# Patient Record
Sex: FEMALE
[De-identification: names, ages, dates, MRNs, and addresses within clinical notes are randomized per-mention and may not be internally consistent; named-entity substitution may affect disease eponyms.]

---

## 2019-03-12 PROBLEM — Z00.00 ENCOUNTER FOR PREVENTIVE HEALTH EXAMINATION: Status: ACTIVE | Noted: 2019-03-12

## 2019-04-04 ENCOUNTER — APPOINTMENT (OUTPATIENT)
Dept: RHEUMATOLOGY | Facility: CLINIC | Age: 69
End: 2019-04-04

## 2019-04-09 ENCOUNTER — LABORATORY RESULT (OUTPATIENT)
Age: 69
End: 2019-04-09

## 2019-04-09 ENCOUNTER — APPOINTMENT (OUTPATIENT)
Dept: RHEUMATOLOGY | Facility: CLINIC | Age: 69
End: 2019-04-09
Payer: MEDICARE

## 2019-04-09 VITALS
OXYGEN SATURATION: 95 % | DIASTOLIC BLOOD PRESSURE: 70 MMHG | WEIGHT: 99.5 LBS | BODY MASS INDEX: 18.31 KG/M2 | HEIGHT: 62 IN | SYSTOLIC BLOOD PRESSURE: 98 MMHG | HEART RATE: 96 BPM

## 2019-04-09 PROCEDURE — 36415 COLL VENOUS BLD VENIPUNCTURE: CPT

## 2019-04-09 PROCEDURE — 99205 OFFICE O/P NEW HI 60 MIN: CPT | Mod: 25

## 2019-04-10 LAB
ALBUMIN SERPL ELPH-MCNC: 3.7 G/DL
ALP BLD-CCNC: 96 U/L
ALT SERPL-CCNC: <5 U/L
ANION GAP SERPL CALC-SCNC: 16 MMOL/L
APPEARANCE: CLEAR
APTT BLD: 34.2 SEC
AST SERPL-CCNC: 16 U/L
B2 MICROGLOB SERPL-MCNC: 3.1 MG/L
BASOPHILS # BLD AUTO: 0.09 K/UL
BASOPHILS NFR BLD AUTO: 0.9 %
BILIRUB SERPL-MCNC: 0.3 MG/DL
BILIRUBIN URINE: NEGATIVE
BLOOD URINE: NEGATIVE
BUN SERPL-MCNC: 18 MG/DL
C3 SERPL-MCNC: 120 MG/DL
C4 SERPL-MCNC: 22 MG/DL
CALCIUM SERPL-MCNC: 9.6 MG/DL
CHLORIDE SERPL-SCNC: 100 MMOL/L
CK SERPL-CCNC: 52 U/L
CO2 SERPL-SCNC: 21 MMOL/L
COLOR: YELLOW
CREAT SERPL-MCNC: 0.54 MG/DL
EOSINOPHIL # BLD AUTO: 0.25 K/UL
EOSINOPHIL NFR BLD AUTO: 2.6 %
ERYTHROCYTE [SEDIMENTATION RATE] IN BLOOD BY WESTERGREN METHOD: 115 MM/HR
GLUCOSE QUALITATIVE U: NEGATIVE
GLUCOSE SERPL-MCNC: 51 MG/DL
HCT VFR BLD CALC: 39.8 %
HGB BLD-MCNC: 11.2 G/DL
KETONES URINE: NEGATIVE
LEUKOCYTE ESTERASE URINE: NEGATIVE
LYMPHOCYTES # BLD AUTO: 3.47 K/UL
LYMPHOCYTES NFR BLD AUTO: 36.3 %
MAN DIFF?: NORMAL
MCHC RBC-ENTMCNC: 23.9 PG
MCHC RBC-ENTMCNC: 28.1 GM/DL
MCV RBC AUTO: 84.9 FL
MONOCYTES # BLD AUTO: 0.42 K/UL
MONOCYTES NFR BLD AUTO: 4.4 %
MPO AB + PR3 PNL SER: NORMAL
NEUTROPHILS # BLD AUTO: 5.24 K/UL
NEUTROPHILS NFR BLD AUTO: 54.9 %
NITRITE URINE: NEGATIVE
PH URINE: 6
PLATELET # BLD AUTO: 644 K/UL
POTASSIUM SERPL-SCNC: 4.4 MMOL/L
PROT SERPL-MCNC: 8.3 G/DL
PROTEIN URINE: ABNORMAL
RBC # BLD: 4.69 M/UL
RBC # FLD: 20.3 %
RHEUMATOID FACT SER QL: >650 IU/ML
SODIUM SERPL-SCNC: 137 MMOL/L
SPECIFIC GRAVITY URINE: 1.03
URATE SERPL-MCNC: 3.2 MG/DL
UROBILINOGEN URINE: NORMAL
WBC # FLD AUTO: 9.55 K/UL

## 2019-04-10 NOTE — CONSULT LETTER
[Dear  ___] : Dear  [unfilled], [Consult Letter:] : I had the pleasure of evaluating your patient, [unfilled]. [Consult Closing:] : Thank you very much for allowing me to participate in the care of this patient.  If you have any questions, please do not hesitate to contact me. [Please see my note below.] : Please see my note below. [Sincerely,] : Sincerely, [FreeTextEntry2] : Dr. Petr Manzo\par [address],\[place] [address] [FreeTextEntry3] : \par \par \par \par Valdemar Roberts M.D.\par Director, Infusion Medicine and [facility]\par [facility]\par

## 2019-04-10 NOTE — PHYSICAL EXAM
[General Appearance - Alert] : alert [General Appearance - In No Acute Distress] : in no acute distress [Sclera] : the sclera and conjunctiva were normal [PERRL With Normal Accommodation] : pupils were equal in size, round, and reactive to light [Extraocular Movements] : extraocular movements were intact [Oropharynx] : the oropharynx was normal [Outer Ear] : the ears and nose were normal in appearance [Neck Appearance] : the appearance of the neck was normal [Neck Cervical Mass (___cm)] : no neck mass was observed [Jugular Venous Distention Increased] : there was no jugular-venous distention [Thyroid Diffuse Enlargement] : the thyroid was not enlarged [Thyroid Nodule] : there were no palpable thyroid nodules [Auscultation Breath Sounds / Voice Sounds] : lungs were clear to auscultation bilaterally [Heart Rate And Rhythm] : heart rate was normal and rhythm regular [Heart Sounds] : normal S1 and S2 [Murmurs] : no murmurs [Heart Sounds Gallop] : no gallops [Heart Sounds Pericardial Friction Rub] : no pericardial rub [Abdomen Soft] : soft [Bowel Sounds] : normal bowel sounds [Abdomen Tenderness] : non-tender [] : no hepato-splenomegaly [Abdomen Mass (___ Cm)] : no abdominal mass palpated [Normal Sphincter Tone] : normal sphincter tone [No Rectal Mass] : no rectal mass [Cervical Lymph Nodes Enlarged Posterior Bilaterally] : posterior cervical [Cervical Lymph Nodes Enlarged Anterior Bilaterally] : anterior cervical [Supraclavicular Lymph Nodes Enlarged Bilaterally] : supraclavicular [Axillary Lymph Nodes Enlarged Bilaterally] : axillary [Femoral Lymph Nodes Enlarged Bilaterally] : femoral [Inguinal Lymph Nodes Enlarged Bilaterally] : inguinal [No Spinal Tenderness] : no spinal tenderness [No CVA Tenderness] : no ~M costovertebral angle tenderness [Abnormal Walk] : normal gait [Nail Clubbing] : no clubbing  or cyanosis of the fingernails [Musculoskeletal - Swelling] : no joint swelling seen [Motor Tone] : muscle strength and tone were normal [Oriented To Time, Place, And Person] : oriented to person, place, and time [Impaired Insight] : insight and judgment were intact [Affect] : the affect was normal [Occult Blood Positive] : stool was negative for occult blood [FreeTextEntry1] : NO SKIN THICKENING OR TELANGIECTASIA ON FACE TRUNK EXTREM. LIVIDO RETICULARIS LE'S.

## 2019-04-10 NOTE — HISTORY OF PRESENT ILLNESS
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 Had to wait 6 mos to get approval for any other meds. Took NSAIDs until November, when developed pain at tailbone, went up back and into leg. Ulnar deviation 2012-13. Also had chest cold getting worse. Noticed fx in sacrum, with tachycardia with admission. Chest studies showed ILD and hilar adenopathy (see below re LN bx.). Sent home with "pneumonia, osteoporosis, scaitica". Pains now in knees and shoulders, wrists, intermittant. Now on Angelica 2mgs a day for one year. \par \par Uses a walker. Since Nov uses this had to stop using cane then.\par \par Late Dec through Jan, develop pericarditis, pleuritis. DC'ed on motrin and cholchicine. Had multiple autoimmune serologies RF, CCP, CHUY, anti-DS. + Hep B surface ab, core +. No known hx of hepatitis. Lymphoma suspected, but bx Feb neg for lymphoma. \par \par No lymphoma in family, no RA SLE, Hashimotos, Crohns, colitis.\par \par No history of male pattern baldness, bald spots or hair loss, malar rashes, photosensitivity, oral or nasal ulcerations, severe abdominal pain and fever, renal disease, epilepsy , psychiatric hospitalization, low wbc counts or swollen and painful joints. \par

## 2019-04-10 NOTE — ASSESSMENT
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 Rob Paez hepatologist re + hep B core ab.\par Refer Dr. Kvng Colunga, head div. Pulm Med [facility] re lung involvement.

## 2019-04-11 LAB
25(OH)D3 SERPL-MCNC: 55 NG/ML
B2 GLYCOPROT1 IGA SERPL IA-ACNC: <5 SAU
B2 GLYCOPROT1 IGG SER-ACNC: <5 SGU
B2 GLYCOPROT1 IGM SER-ACNC: 6.3 SMU
CENTROMERE IGG SER-ACNC: <0.2 CD:130001892
CH50 SERPL-MCNC: 21 U/ML
CONFIRM: 28.9 SEC
DRVVT IMM 1:2 NP PPP: NORMAL
DRVVT SCREEN TO CONFIRM RATIO: 1.12 RATIO
DSDNA AB SER-ACNC: <12 IU/ML
ENA RNP AB SER IA-ACNC: 0.2 AL
ENA SCL70 IGG SER IA-ACNC: <0.2 AL
ENA SM AB SER IA-ACNC: <0.2 AL
ENA SS-A AB SER IA-ACNC: <0.2 AL
ENA SS-B AB SER IA-ACNC: <0.2 AL
HISTONE AB SER QL: 0.5 UNITS
SCREEN DRVVT: 38.8 SEC
SILICA CLOTTING TIME INTERPRETATION: NORMAL
SILICA CLOTTING TIME S/C: 1.03 RATIO
T PALLIDUM AB SER QL IA: NEGATIVE

## 2019-04-16 ENCOUNTER — APPOINTMENT (OUTPATIENT)
Dept: RHEUMATOLOGY | Facility: CLINIC | Age: 69
End: 2019-04-16
Payer: MEDICARE

## 2019-04-16 LAB
ANA PAT FLD IF-IMP: ABNORMAL
ANA SER IF-ACNC: ABNORMAL
CARDIOLIPIN AB SER IA-ACNC: POSITIVE
CCP AB SER IA-ACNC: >250 UNITS
CRYOFIB BLD-MCNC: NEGATIVE
MISCELLANEOUS TEST: NORMAL
PROC NAME: NORMAL
RF+CCP IGG SER-IMP: ABNORMAL

## 2019-04-16 PROCEDURE — 36415 COLL VENOUS BLD VENIPUNCTURE: CPT

## 2019-04-19 LAB
HBV CORE IGG+IGM SER QL: REACTIVE
HBV E AB SER QL: NEGATIVE
HBV E AG SER QL: NEGATIVE
HBV SURFACE AB SER QL: REACTIVE
HBV SURFACE AG SER QL: NONREACTIVE
HCV AB SER QL: NONREACTIVE
HCV S/CO RATIO: 0.55 S/CO

## 2019-04-23 LAB
M TB IFN-G BLD-IMP: NEGATIVE
QUANTIFERON TB PLUS MITOGEN MINUS NIL: 4.27 IU/ML
QUANTIFERON TB PLUS NIL: 0.09 IU/ML
QUANTIFERON TB PLUS TB1 MINUS NIL: -0.03 IU/ML
QUANTIFERON TB PLUS TB2 MINUS NIL: -0.03 IU/ML

## 2019-05-30 ENCOUNTER — APPOINTMENT (OUTPATIENT)
Dept: PULMONOLOGY | Facility: CLINIC | Age: 69
End: 2019-05-30
Payer: MEDICARE

## 2019-05-30 VITALS
WEIGHT: 96.5 LBS | OXYGEN SATURATION: 98 % | DIASTOLIC BLOOD PRESSURE: 78 MMHG | TEMPERATURE: 97.6 F | SYSTOLIC BLOOD PRESSURE: 90 MMHG | HEIGHT: 62 IN | HEART RATE: 109 BPM | BODY MASS INDEX: 17.76 KG/M2

## 2019-05-30 DIAGNOSIS — Z87.898 PERSONAL HISTORY OF OTHER SPECIFIED CONDITIONS: ICD-10-CM

## 2019-05-30 DIAGNOSIS — Z87.891 PERSONAL HISTORY OF NICOTINE DEPENDENCE: ICD-10-CM

## 2019-05-30 DIAGNOSIS — Z82.49 FAMILY HISTORY OF ISCHEMIC HEART DISEASE AND OTHER DISEASES OF THE CIRCULATORY SYSTEM: ICD-10-CM

## 2019-05-30 DIAGNOSIS — Z77.29 CONTACT WITH AND (SUSPECTED) EXPOSURE TO OTHER HAZARDOUS SUBSTANCES: ICD-10-CM

## 2019-05-30 DIAGNOSIS — Z91.89 OTHER SPECIFIED PERSONAL RISK FACTORS, NOT ELSEWHERE CLASSIFIED: ICD-10-CM

## 2019-05-30 DIAGNOSIS — Z82.0 FAMILY HISTORY OF EPILEPSY AND OTHER DISEASES OF THE NERVOUS SYSTEM: ICD-10-CM

## 2019-05-30 PROCEDURE — 99204 OFFICE O/P NEW MOD 45 MIN: CPT

## 2019-05-30 PROCEDURE — 94060 EVALUATION OF WHEEZING: CPT

## 2019-05-30 PROCEDURE — 94729 DIFFUSING CAPACITY: CPT

## 2019-05-30 PROCEDURE — 94618 PULMONARY STRESS TESTING: CPT

## 2019-05-30 PROCEDURE — 94727 GAS DIL/WSHOT DETER LNG VOL: CPT

## 2019-05-31 ENCOUNTER — OTHER (OUTPATIENT)
Age: 69
End: 2019-05-31

## 2019-06-01 NOTE — HISTORY OF PRESENT ILLNESS
[FreeTextEntry1] : Pt is a 70 yo F with PMH mixed connective tissue disease, persistent tachycardia.\par Referred by Dr. Roberts for evaluation of ILD in the setting of mixed connective tissue disease.\par \par She was put on methotrexate in 2010 but she had a reaction so it was stopped. Currently on sulfasalazine and naproxen. \par \par She can walk 8+ blocks without any significant shortness of breath.  Walks with rollator.  Knees limit her, prevent her from going up the stairs. \par \par Had elevated HR and was seen in ED, cause was not diagnosed. Has been worked up with cardiology.  HR today is 109 bpm.\par PNA 11/2018, 2009.\par Had PET scan, negative for lymphoma.\par Typically has postnasal drip in the spring.\par \par Social history: Former cigarettes smoker (20 yo - 58 yo, 1/2 PPD, 20 pack-years), currently smokes marijuana few times per week, will also ingest cannabis on occasion, worked at an animal hospital\par FHx: father Alzheimers, mother heart disease

## 2019-06-01 NOTE — DISCUSSION/SUMMARY
[FreeTextEntry1] : ATTENDING SUMMARY \par 5-30-19 \par -nasal mucosa atrophic, no nasal discharge, no hepatojugular reflux \par -no JVD at 45 degrees \par -tachycardiac, HR regular\par -no dullness to percussion, adequate air entry, fine velcro inspiratory crackles\par -deforming arthritis in hands and knees\par -trace pitting edema L > R\par \par Pulmonary function tests demonstrate large lung volumes, mild expiratory airflow obstruction with no significant response to inhaled bronchodilator.   DLCO is mildly decreased. \par Patient walked 300 meters on 6MWT using rollator.    This represents a mildly reduced walk distance with no desaturation (97% to 98%)\par

## 2019-06-01 NOTE — PROCEDURE
[FreeTextEntry1] : PFT 19:\par FVC: 2.85 L (108%) --> 2.93 L (111%)\par FEV1: 1.77 L (84%) --> 1.79 L (84%)\par FEV1/FVC: 62%) --> 61%\par BHP02-13%: 0.90 L/s (40%) --> 0.91 L/s (41%)\par T.53 L (125%)\par RV/T%\par DLCO: 10.4 (73%)\par 6MWT: 300 meters, SpO2 start: 97% --> SpO2 end: 98%\par  Mild obstructive defect with no significant bronchodilator response. No restrictive defect. Mildly decreased diffusion capacity. Reduced walk distance with no desaturation.\par

## 2019-06-01 NOTE — PHYSICAL EXAM
[Normal Appearance] : normal appearance [IV] : IV [Murmurs] : no murmurs present [Gait - Sufficient For Exercise Testing] : the gait was sufficient for exercise testing [] : no rash [No Focal Deficits] : no focal deficits [Oriented To Time, Place, And Person] : oriented to person, place, and time [FreeTextEntry1] : deforming arthritis in hands and knees [FreeTextEntry2] : trace pitting edema L > R

## 2019-06-01 NOTE — REVIEW OF SYSTEMS
[Dry Eyes] : dryness of the eyes [Postnasal Drip] : postnasal drip [Hay Fever] : hay fever [Menopause] : menopause [As Noted in HPI] : as noted in HPI [Arthralgias] : arthralgias [Snoring] : snoring [Awakes With Dry Mouth] : awakes with dry mouth [Negative] : Endocrine [Cough] : no cough [Hemoptysis] : no hemoptysis [Dyspnea] : no dyspnea [Chest Tightness] : no chest tightness [Pleuritic Pain] : no pleuritic pain [Wheezing] : no wheezing [FreeTextEntry9] : elevated heart rate

## 2019-06-01 NOTE — CONSULT LETTER
[Dear  ___] : Dear ~LINDA, [Consult Letter:] : I had the pleasure of evaluating your patient, [unfilled]. [Please see my note below.] : Please see my note below. [Consult Closing:] : Thank you very much for allowing me to participate in the care of this patient.  If you have any questions, please do not hesitate to contact me. [Sincerely,] : Sincerely, [FreeTextEntry2] : Dr. Valdemar Roberts [FreeTextEntry3] : Kvng Colunga MD

## 2019-06-01 NOTE — END OF VISIT
[>50% of Time Spent on Counseling and Coordination of Care for  ___] : Greater than 50% of the encounter time was spent on counseling and coordination of care for [unfilled] [Time Spent: ___ minutes] : I have spent [unfilled] minutes of face to face time with the patient [FreeTextEntry3] : All medical record entries made by the Scribe were at my, Dr. Kvng Colunga's direction and personally dictated by me.   I have reviewed the chart and agree that the record accurately reflects my personal performance of the history, physical exam, assessment and plan. I have also personally directed, reviewed, and agreed with the chart.

## 2019-06-01 NOTE — ASSESSMENT
[FreeTextEntry1] : 5-30-19\par It was a pleasure to meet Abby in consultation today. Her respiratory issues are:\par \par 1. ILD in the setting of mixed connective tissue disease\par Abby was referred to us by Dr. Roberts for evaluation of possible ILD in the setting of MCTD. She states she was diagnosed with rheumatoid arthritis approximately 10 years ago and was initially on methotrexate, but was taken off as she had adverse reactions including vomiting. She is currently on sulfasalazine and naproxen. \par She believes she has had a CT of the chest during her fall admission to [place], but it is not currently available for us to review.  She did bring a PET scan on a CD but it did not have any CT chest images on it. We will request prior CTs of the chest and CXRs to be uploaded in our system for review.  On exam, there are inspiratory crackles at both lung bases, which could indicate possible fibrosis. On her PFT, there was no evidence of a restrictive defect (% of predicted). Her walk distance on 6MWT today was reduced (300 meters), but there was no desaturation. Arthritis in her knees limits her mobility. \par Of note, her RF was significantly elevated to >650 in April 2019. Her anti-CCP Ab was also elevated to >250, indicating a strong positive. These are risk factors for the development of ILD in rheumatoid arthritis. I have discussed with Abby the need for better control of her RA. We discussed medications such as mycophenolate and rituximab.  She is concerned about GI upset and nausea, so I have discussed with her that rituximab is the better choice for her. She will follow-up with Dr. Roberts for management of her MCTD.\par Also, after I review her lung imaging, we will make other recommendations regarding next steps.\par \par 2. Risk of pulmonary hypertension\par I have discussed with Abby the increased risk of pulmonary hypertension in the setting of MCTD. She does not have JVD on exam today. Her diffusion capacity is mildly reduced. She did not desaturate on 6MWT.  We will get an echo to assess her right-sided pressures.\par \par 3. At risk for sleep apnea\par Abby has a Mallampati score of 4 and has been told she snores and frequently wakes with a dry mouth. She also has persistent tachycardia, which she states has been worked up by cardiology. Sleep apnea can increase the risk of arrhythmia, so I would like to assess with an attended sleep study.\par \par 4. Health maintenance\par Abby has never received a pneumococcal vaccination and adamantly refused vaccination today.\par \par Return to clinic 3 months

## 2019-06-12 ENCOUNTER — APPOINTMENT (OUTPATIENT)
Dept: RHEUMATOLOGY | Facility: CLINIC | Age: 69
End: 2019-06-12
Payer: MEDICARE

## 2019-06-12 VITALS
WEIGHT: 96.5 LBS | DIASTOLIC BLOOD PRESSURE: 80 MMHG | SYSTOLIC BLOOD PRESSURE: 114 MMHG | BODY MASS INDEX: 17.76 KG/M2 | TEMPERATURE: 98.6 F | HEART RATE: 95 BPM | OXYGEN SATURATION: 98 % | HEIGHT: 62 IN

## 2019-06-12 DIAGNOSIS — M05.10 RHEUMATOID LUNG DISEASE WITH RHEUMATOID ARTHRITIS OF UNSPECIFIED SITE: ICD-10-CM

## 2019-06-12 PROCEDURE — 99215 OFFICE O/P EST HI 40 MIN: CPT

## 2019-06-12 NOTE — PHYSICAL EXAM
[General Appearance - In No Acute Distress] : in no acute distress [General Appearance - Alert] : alert [Sclera] : the sclera and conjunctiva were normal [PERRL With Normal Accommodation] : pupils were equal in size, round, and reactive to light [Extraocular Movements] : extraocular movements were intact [Deep Tendon Reflexes (DTR)] : deep tendon reflexes were 2+ and symmetric [Sensation] : the sensory exam was normal to light touch and pinprick [No Focal Deficits] : no focal deficits [Oriented To Time, Place, And Person] : oriented to person, place, and time [Impaired Insight] : insight and judgment were intact [Affect] : the affect was normal [FreeTextEntry1] : NO CHANGE. SEVERE ULNAR DEVIATION.

## 2019-06-12 NOTE — ASSESSMENT
[FreeTextEntry1] : RA with joint and lung involvement. Do not think this is MCTD. + CHUY as result of severe RA, no evidence SLE.\par \par PLAN\par \par Recommend Remicade. I feel this drug will reduce sx arhtritis and pulm disease. Can always transition to Rituxan if not effective. Consent read and signed.\par \par See in 3 mos.

## 2019-06-12 NOTE — HISTORY OF PRESENT ILLNESS
[FreeTextEntry1] : RA, with prob lung involvement. Patient reports continues to take naproxen and azulfadine, and follows alkaline diet "as best she can", and states she feels better. Patient arrives for tests results. Patient concerned about previously discussed infusions treatments, "I really and scared,  I cannot handle any vomiting". Otherwise patient appears well. Ambulatory with walker. \par \par High levels RF and CCP, ACL IgM 34.3, no ho clots DVT PE MI CVA. . Xrays show erosive disease in shoulders hands feet shoulders.\par \par Dr. Colunga finds mild lung disease, suggests Cellcept or Rituxan as treatment for both.\par \par Pain 4/10 most days. Every few weeks, 10/10 in one joint on another.\par \par Uses walker because of low back pain. \par \par + CHUY, but no suggestion of scleroderma aside from lung disease.

## 2019-06-13 ENCOUNTER — FORM ENCOUNTER (OUTPATIENT)
Age: 69
End: 2019-06-13

## 2019-06-14 ENCOUNTER — OUTPATIENT (OUTPATIENT)
Dept: OUTPATIENT SERVICES | Facility: HOSPITAL | Age: 69
LOS: 1 days | End: 2019-06-14
Payer: MEDICARE

## 2019-06-14 DIAGNOSIS — M35.1 OTHER OVERLAP SYNDROMES: ICD-10-CM

## 2019-06-14 PROCEDURE — 93306 TTE W/DOPPLER COMPLETE: CPT

## 2019-06-14 PROCEDURE — 93306 TTE W/DOPPLER COMPLETE: CPT | Mod: 26

## 2019-06-17 ENCOUNTER — RESULT REVIEW (OUTPATIENT)
Age: 69
End: 2019-06-17

## 2019-06-20 ENCOUNTER — OTHER (OUTPATIENT)
Age: 69
End: 2019-06-20

## 2019-06-24 ENCOUNTER — CLINICAL ADVICE (OUTPATIENT)
Age: 69
End: 2019-06-24

## 2019-06-27 ENCOUNTER — APPOINTMENT (OUTPATIENT)
Dept: HEART AND VASCULAR | Facility: CLINIC | Age: 69
End: 2019-06-27
Payer: MEDICARE

## 2019-06-27 VITALS
HEART RATE: 99 BPM | BODY MASS INDEX: 18.79 KG/M2 | SYSTOLIC BLOOD PRESSURE: 100 MMHG | HEIGHT: 60.24 IN | OXYGEN SATURATION: 99 % | DIASTOLIC BLOOD PRESSURE: 66 MMHG | TEMPERATURE: 98 F | WEIGHT: 96.98 LBS

## 2019-06-27 DIAGNOSIS — Z87.891 PERSONAL HISTORY OF NICOTINE DEPENDENCE: ICD-10-CM

## 2019-06-27 DIAGNOSIS — R94.31 ABNORMAL ELECTROCARDIOGRAM [ECG] [EKG]: ICD-10-CM

## 2019-06-27 PROCEDURE — 99205 OFFICE O/P NEW HI 60 MIN: CPT | Mod: 25

## 2019-06-27 PROCEDURE — 93000 ELECTROCARDIOGRAM COMPLETE: CPT

## 2019-06-27 PROCEDURE — G0444 DEPRESSION SCREEN ANNUAL: CPT | Mod: 59

## 2019-06-27 PROCEDURE — G0446: CPT | Mod: 59

## 2019-06-27 PROCEDURE — 99401 PREV MED CNSL INDIV APPRX 15: CPT

## 2019-06-27 RX ORDER — NAPROXEN 375 MG/1
375 TABLET ORAL
Refills: 0 | Status: ACTIVE | COMMUNITY

## 2019-06-27 RX ORDER — SULFASALAZINE 500 MG/1
500 TABLET ORAL 4 TIMES DAILY
Qty: 4 | Refills: 0 | Status: ACTIVE | COMMUNITY

## 2019-07-01 NOTE — HISTORY OF PRESENT ILLNESS
[FreeTextEntry1] : 68 yo F with PMH mixed connective tissue disease, persistent tachycardia.\par Referred by Dr. Colunga for evaluation of Cardiomyopthay in the setting of mixed connective tissue disease with lung involement.\par \par EKG NSR non specific st changes\par \par Echo 6/2019 EF 35 moderate global hk grade 1 dd nor valve disease normal RH pressures

## 2019-07-01 NOTE — DISCUSSION/SUMMARY
[FreeTextEntry1] : The number of diagnostic and/or management options \par \par Chronic systolic HF - EF 35%\par well cmpensated.\par likely etiology NICM - however will need to r/o cad with NST\par if normal then will initate HF GDMT with BB/ACEI and transition to Entresto\par consider CMRI\par \par \par \par Labs, radiology: ekg ct echo\par \par Aspirin therapy:\par \par LDL: n/a\par \par High Complexity Medical Decision Making\par \par Annual administration of PHQ-2/9 for the benefit of the patient\par Score = 0\par Rx = Reassurance provided\par \par New patient intensive behavioral therapy for cardiovascular disease (17min)\par Assess: risk of inc CVD\par Advise: ASA utility, BP monitoring, healthy diet, result in lower risk of CAD, DM, and HTN\par Agree: pt willing to change, agrees to behavioral change to promote a healthy diet\par Assist: behavioral change re: appropriate food choices, confidence in ability lower CAD risk\par Arrange: follow up visit for progress, preventive cardiology / nutritionist referral discussed\par \par Cardiovascular Prevention counseling (16min)\par ·	Advised SBP guidelines based on ACC/AHA and SPRINT trial increased CAD PAD and mortality \par ·	Assessed willingness to attempt - contemplation stage\par ·	Agree to no added salt and added sugar diet  - prevention medicine referral, nutritionist\par ·	Assist with resources provided - prevention medicine referral, nutritionist, individual counseling\par ·	Arrange ·	Follow-up arranged - next scheduled visit\par

## 2019-07-01 NOTE — PHYSICAL EXAM
[General Appearance - Well Developed] : well developed [Normal Appearance] : normal appearance [Well Groomed] : well groomed [General Appearance - Well Nourished] : well nourished [No Deformities] : no deformities [General Appearance - In No Acute Distress] : no acute distress [Normal Conjunctiva] : the conjunctiva exhibited no abnormalities [Eyelids - No Xanthelasma] : the eyelids demonstrated no xanthelasmas [Normal Oral Mucosa] : normal oral mucosa [No Oral Pallor] : no oral pallor [No Oral Cyanosis] : no oral cyanosis [Normal Jugular Venous A Waves Present] : normal jugular venous A waves present [Normal Jugular Venous V Waves Present] : normal jugular venous V waves present [No Jugular Venous Jo A Waves] : no jugular venous jo A waves [Heart Rate And Rhythm] : heart rate and rhythm were normal [Heart Sounds] : normal S1 and S2 [Murmurs] : no murmurs present [Respiration, Rhythm And Depth] : normal respiratory rhythm and effort [Exaggerated Use Of Accessory Muscles For Inspiration] : no accessory muscle use [Auscultation Breath Sounds / Voice Sounds] : lungs were clear to auscultation bilaterally [Abdomen Soft] : soft [Abdomen Tenderness] : non-tender [Abdomen Mass (___ Cm)] : no abdominal mass palpated [Abnormal Walk] : normal gait [Gait - Sufficient For Exercise Testing] : the gait was sufficient for exercise testing [Nail Clubbing] : no clubbing of the fingernails [Cyanosis, Localized] : no localized cyanosis [Petechial Hemorrhages (___cm)] : no petechial hemorrhages [Skin Color & Pigmentation] : normal skin color and pigmentation [] : no rash [No Venous Stasis] : no venous stasis [Skin Lesions] : no skin lesions [No Skin Ulcers] : no skin ulcer [No Xanthoma] : no  xanthoma was observed

## 2019-07-09 ENCOUNTER — APPOINTMENT (OUTPATIENT)
Dept: HEART AND VASCULAR | Facility: CLINIC | Age: 69
End: 2019-07-09
Payer: MEDICARE

## 2019-07-09 VITALS — WEIGHT: 96.98 LBS | HEIGHT: 60.24 IN | BODY MASS INDEX: 18.79 KG/M2

## 2019-07-09 PROCEDURE — A9500: CPT

## 2019-07-09 PROCEDURE — 93015 CV STRESS TEST SUPVJ I&R: CPT

## 2019-07-09 PROCEDURE — 78452 HT MUSCLE IMAGE SPECT MULT: CPT

## 2019-08-13 ENCOUNTER — OTHER (OUTPATIENT)
Age: 69
End: 2019-08-13

## 2019-09-12 ENCOUNTER — APPOINTMENT (OUTPATIENT)
Dept: RHEUMATOLOGY | Facility: CLINIC | Age: 69
End: 2019-09-12
Payer: MEDICARE

## 2019-09-12 VITALS
TEMPERATURE: 98.1 F | HEART RATE: 95 BPM | SYSTOLIC BLOOD PRESSURE: 130 MMHG | DIASTOLIC BLOOD PRESSURE: 79 MMHG | OXYGEN SATURATION: 91 % | BODY MASS INDEX: 17.66 KG/M2 | HEIGHT: 62 IN | WEIGHT: 96 LBS

## 2019-09-12 DIAGNOSIS — M05.742 RHEUMATOID ARTHRITIS WITH RHEUMATOID FACTOR OF RIGHT HAND W/OUT ORGAN OR SYSTEMS INVOLVEMENT: ICD-10-CM

## 2019-09-12 DIAGNOSIS — J84.9 INTERSTITIAL PULMONARY DISEASE, UNSPECIFIED: ICD-10-CM

## 2019-09-12 DIAGNOSIS — M05.741 RHEUMATOID ARTHRITIS WITH RHEUMATOID FACTOR OF RIGHT HAND W/OUT ORGAN OR SYSTEMS INVOLVEMENT: ICD-10-CM

## 2019-09-12 DIAGNOSIS — I42.9 CARDIOMYOPATHY, UNSPECIFIED: ICD-10-CM

## 2019-09-12 PROCEDURE — 99214 OFFICE O/P EST MOD 30 MIN: CPT

## 2019-09-12 NOTE — ASSESSMENT
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 Severe joint damage and ulnar deviation.\par RA/SLE related IDB. \par ? SLE related myocarditis.\par \par PLAN\par \par Will discuss Remicade with Dr. Colunga. \par WOuld like patient on 81 mg ASA for clot prophylaxis. Dr. Reynolds will advise.\par See here re biologic after above discussion.

## 2019-09-12 NOTE — PHYSICAL EXAM
[Oriented To Time, Place, And Person] : oriented to person, place, and time [Affect] : the affect was normal [Impaired Insight] : insight and judgment were intact [FreeTextEntry1] : NO CHANGE IN ULNAR DRIFT, NO ACUTE INFLAMED JOINTS.

## 2019-09-12 NOTE — HISTORY OF PRESENT ILLNESS
[FreeTextEntry1] : Pt had nuclear stress test showing 36% EF and normal perfusion, suggesting ho myocarditis. Dr Colunga feels that pt had ILD prob related to one or the other autoimmune disease.\par \par She has a very high CHUY 1:1280, and a very high ACL IgM antibody 34. I wonder if her myocarditis is SLE related. Dr. Mancera suggested Rituxan for the pulm disease, my feeling is to use Remicade first. Neg w/u for scleroderma, this could be partial MCTD with 2/3 illness components.\par \par No H/O clots, MI, CVA, DVT, PE.

## 2019-10-10 ENCOUNTER — APPOINTMENT (OUTPATIENT)
Dept: HEART AND VASCULAR | Facility: CLINIC | Age: 69
End: 2019-10-10
Payer: MEDICARE

## 2019-10-10 PROCEDURE — 99214 OFFICE O/P EST MOD 30 MIN: CPT

## 2019-10-10 PROCEDURE — 93000 ELECTROCARDIOGRAM COMPLETE: CPT

## 2019-10-10 RX ORDER — METOPROLOL SUCCINATE 25 MG/1
25 TABLET, EXTENDED RELEASE ORAL DAILY
Qty: 90 | Refills: 3 | Status: ACTIVE | COMMUNITY
Start: 2019-10-10 | End: 1900-01-01

## 2019-10-10 RX ORDER — SACUBITRIL AND VALSARTAN 24; 26 MG/1; MG/1
24-26 TABLET, FILM COATED ORAL
Qty: 180 | Refills: 3 | Status: ACTIVE | COMMUNITY
Start: 2019-10-10 | End: 1900-01-01

## 2019-10-14 NOTE — HISTORY OF PRESENT ILLNESS
[FreeTextEntry1] : 68 yo F with PMH mixed connective tissue disease, persistent tachycardia.\par Referred by Dr. Colunga for evaluation of Cardiomyopthay in the setting of mixed connective tissue disease with lung involement.\par \par EKG NSR non specific st changes\par \par Echo 6/2019 EF 35 moderate global hk grade 1 dd nor valve disease normal RH pressures\par \par 10/10/19 Interval Symptoms: USOH, 100% compliant with meds\par location: chest\par duration: na\par  modifying factors: na\par timing: na\par severity: 0/10\par EKG unchanged from prior (in chart)\par consultation notes reviewed where appropriate\par NYHA Functional Class: II\par Home monitoring: Blood Pressure\par Interval Triggers: None. \par Medications: the patient is adherent with his medication regimen. denies medication side effects. \par Disease Management: the patient is doing well with goals. \par \par

## 2019-10-14 NOTE — PHYSICAL EXAM
[Well Groomed] : well groomed [Normal Appearance] : normal appearance [General Appearance - Well Developed] : well developed [General Appearance - Well Nourished] : well nourished [No Deformities] : no deformities [General Appearance - In No Acute Distress] : no acute distress [Eyelids - No Xanthelasma] : the eyelids demonstrated no xanthelasmas [Normal Conjunctiva] : the conjunctiva exhibited no abnormalities [No Oral Cyanosis] : no oral cyanosis [No Oral Pallor] : no oral pallor [Normal Oral Mucosa] : normal oral mucosa [Normal Jugular Venous A Waves Present] : normal jugular venous A waves present [Normal Jugular Venous V Waves Present] : normal jugular venous V waves present [No Jugular Venous Jo A Waves] : no jugular venous jo A waves [Respiration, Rhythm And Depth] : normal respiratory rhythm and effort [Auscultation Breath Sounds / Voice Sounds] : lungs were clear to auscultation bilaterally [Heart Rate And Rhythm] : heart rate and rhythm were normal [Exaggerated Use Of Accessory Muscles For Inspiration] : no accessory muscle use [Murmurs] : no murmurs present [Heart Sounds] : normal S1 and S2 [Abdomen Soft] : soft [Abdomen Tenderness] : non-tender [Abnormal Walk] : normal gait [Abdomen Mass (___ Cm)] : no abdominal mass palpated [Gait - Sufficient For Exercise Testing] : the gait was sufficient for exercise testing [Nail Clubbing] : no clubbing of the fingernails [Cyanosis, Localized] : no localized cyanosis [Petechial Hemorrhages (___cm)] : no petechial hemorrhages [Skin Color & Pigmentation] : normal skin color and pigmentation [] : no rash [No Venous Stasis] : no venous stasis [No Skin Ulcers] : no skin ulcer [No Xanthoma] : no  xanthoma was observed [Skin Lesions] : no skin lesions

## 2019-10-14 NOTE — DISCUSSION/SUMMARY
[FreeTextEntry1] : The number of diagnostic and/or management options \par \par Chronic systolic HF - EF 35%\par well cmpensated.\par likely etiology NICM - normal  NST NICM\par  initate HF GDMT with BB/ACEI and transition to Entresto\par order CMRI\par \par \par \par Labs, radiology: ekg ct echo\par \par Aspirin therapy:\par \par LDL: n/a\par \par High Complexity Medical Decision Making\par

## 2020-01-14 ENCOUNTER — APPOINTMENT (OUTPATIENT)
Dept: HEART AND VASCULAR | Facility: CLINIC | Age: 70
End: 2020-01-14